# Patient Record
Sex: MALE | Race: WHITE | URBAN - METROPOLITAN AREA
[De-identification: names, ages, dates, MRNs, and addresses within clinical notes are randomized per-mention and may not be internally consistent; named-entity substitution may affect disease eponyms.]

---

## 2022-02-21 ENCOUNTER — APPOINTMENT (OUTPATIENT)
Dept: SURGERY | Age: 42
End: 2022-02-21

## 2022-02-24 ENCOUNTER — OFFICE VISIT (OUTPATIENT)
Dept: SURGERY | Facility: CLINIC | Age: 42
End: 2022-02-24
Payer: COMMERCIAL

## 2022-02-24 VITALS
DIASTOLIC BLOOD PRESSURE: 69 MMHG | HEART RATE: 130 BPM | BODY MASS INDEX: 26.6 KG/M2 | TEMPERATURE: 98 F | HEIGHT: 71 IN | WEIGHT: 190 LBS | SYSTOLIC BLOOD PRESSURE: 89 MMHG

## 2022-02-24 DIAGNOSIS — K40.20 BILATERAL INGUINAL HERNIA WITHOUT OBSTRUCTION OR GANGRENE, RECURRENCE NOT SPECIFIED: Primary | ICD-10-CM

## 2022-02-24 PROCEDURE — 3074F SYST BP LT 130 MM HG: CPT | Performed by: COLON & RECTAL SURGERY

## 2022-02-24 PROCEDURE — 3078F DIAST BP <80 MM HG: CPT | Performed by: COLON & RECTAL SURGERY

## 2022-02-24 PROCEDURE — 99204 OFFICE O/P NEW MOD 45 MIN: CPT | Performed by: COLON & RECTAL SURGERY

## 2022-02-24 PROCEDURE — 3008F BODY MASS INDEX DOCD: CPT | Performed by: COLON & RECTAL SURGERY

## 2022-02-24 NOTE — PATIENT INSTRUCTIONS
The patient presents today for evaluation of inguinal hernias. Patient states approximately a year ago he was lifting a bed frame when he noticed a bulge in his right groin. He states initially it was not very symptomatic, but over the past 2 to 3 months he has been experiencing more symptoms and thinks his hernia has grown in size. He states he has some pain in the area, but is still able to work and do activities of daily living. 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 He denies diarrhea, constipation or change in bowel movements. He denies fevers, chills, nausea, or vomiting. He does not have any significant past medical history. He is not currently taking any blood thinners. Clinical examination of the abdomen reveals it to be soft, nondistended, nontender, bowel sounds are normal activity normal pitch. There  is no rebounding tenderness or guarding. There are no signs of ascites or peritonitis. The liver and spleen are nonpalpable. There are no palpable masses. There are no umbilical or incisional hernias. He has a large right-sided inguinal hernia and a small left-sided inguinal hernia. both are large and with standing and Valsalva maneuver. Testes are descended, equal, and normal size bilaterally. Penis is normal and circumcised. The patient will be scheduled to undergo a bilateral inguinal hernia repair with mesh. The patient recently was off work for a right foot injury. He states because he is in a union for his job, he must work to months of every quarter. He wants to have the surgery in late June or late September, so he can guarantee that he will work at least 2 months of either the second or third quarter of this year.     I had a prolonged conversation with the patient regarding all risks, benefits, complications and alternatives to the proposed procedure(s) were fully discussed with the patient. All questions from the patient were answered in detail. A description of the procedure(s) possible outcomes was fully discussed. The patient seemed to understand the conversation and its details. Consent for the procedure(s) was confirmed with the patient.

## 2022-02-25 PROBLEM — K40.20 BILATERAL INGUINAL HERNIA WITHOUT OBSTRUCTION OR GANGRENE: Status: ACTIVE | Noted: 2022-02-25

## 2022-03-04 ENCOUNTER — TELEPHONE (OUTPATIENT)
Dept: SURGERY | Facility: CLINIC | Age: 42
End: 2022-03-04

## 2022-03-04 NOTE — TELEPHONE ENCOUNTER
Called patient after his employer called and asked for patient's work status. I explained we can not give that information out unless he is on his release of information form. The patient is coming in on Monday to sign a release stating his employer can have his work status from Dr. Bartolo France only. I told patient I would talk to Dr. Bartolo France on Monday regarding his work status and restrictions prior to his surgery in June. He was agreeable.

## 2022-03-07 ENCOUNTER — TELEPHONE (OUTPATIENT)
Dept: SURGERY | Facility: CLINIC | Age: 42
End: 2022-03-07

## 2022-03-07 NOTE — TELEPHONE ENCOUNTER
Talked to Dr. Mauro Valenzuela regarding work status letter patient needs for work. Dr. Mauro Valenzuela stated he can work until his surgery date with no restrictions. Patient must sign release before we release the letter.   Letter and release with [employer] with instructions for patient to sign release first.

## 2022-05-19 ENCOUNTER — TELEPHONE (OUTPATIENT)
Dept: SURGERY | Facility: CLINIC | Age: 42
End: 2022-05-19

## 2022-06-01 ENCOUNTER — LAB ENCOUNTER (OUTPATIENT)
Dept: LAB | Facility: HOSPITAL | Age: 42
End: 2022-06-01
Attending: COLON & RECTAL SURGERY
Payer: COMMERCIAL

## 2022-06-01 DIAGNOSIS — Z20.822 ENCOUNTER FOR PREOPERATIVE SCREENING LABORATORY TESTING FOR COVID-19 VIRUS: ICD-10-CM

## 2022-06-01 DIAGNOSIS — Z01.812 ENCOUNTER FOR PREOPERATIVE SCREENING LABORATORY TESTING FOR COVID-19 VIRUS: ICD-10-CM

## 2022-06-01 RX ORDER — SCOLOPAMINE TRANSDERMAL SYSTEM 1 MG/1
1 PATCH, EXTENDED RELEASE TRANSDERMAL ONCE
Status: CANCELLED | OUTPATIENT
Start: 2022-06-01 | End: 2022-06-01

## 2022-06-02 ENCOUNTER — ANESTHESIA EVENT (OUTPATIENT)
Dept: SURGERY | Facility: HOSPITAL | Age: 42
End: 2022-06-02
Payer: COMMERCIAL

## 2022-06-02 LAB — SARS-COV-2 RNA RESP QL NAA+PROBE: NOT DETECTED

## 2022-06-03 ENCOUNTER — HOSPITAL ENCOUNTER (OUTPATIENT)
Facility: HOSPITAL | Age: 42
Setting detail: HOSPITAL OUTPATIENT SURGERY
Discharge: HOME | End: 2022-06-03
Attending: COLON & RECTAL SURGERY | Admitting: COLON & RECTAL SURGERY
Payer: COMMERCIAL

## 2022-06-03 ENCOUNTER — ANESTHESIA (OUTPATIENT)
Dept: SURGERY | Facility: HOSPITAL | Age: 42
End: 2022-06-03
Payer: COMMERCIAL

## 2022-06-03 VITALS
HEART RATE: 66 BPM | TEMPERATURE: 99 F | RESPIRATION RATE: 18 BRPM | OXYGEN SATURATION: 100 % | BODY MASS INDEX: 26.99 KG/M2 | WEIGHT: 192.81 LBS | SYSTOLIC BLOOD PRESSURE: 146 MMHG | HEIGHT: 71 IN | DIASTOLIC BLOOD PRESSURE: 97 MMHG

## 2022-06-03 DIAGNOSIS — Z20.822 ENCOUNTER FOR PREOPERATIVE SCREENING LABORATORY TESTING FOR COVID-19 VIRUS: Primary | ICD-10-CM

## 2022-06-03 DIAGNOSIS — K40.20 BILATERAL INGUINAL HERNIA WITHOUT OBSTRUCTION OR GANGRENE, RECURRENCE NOT SPECIFIED: ICD-10-CM

## 2022-06-03 DIAGNOSIS — Z01.812 ENCOUNTER FOR PREOPERATIVE SCREENING LABORATORY TESTING FOR COVID-19 VIRUS: Primary | ICD-10-CM

## 2022-06-03 PROCEDURE — 0YUA4JZ SUPPLEMENT BILATERAL INGUINAL REGION WITH SYNTHETIC SUBSTITUTE, PERCUTANEOUS ENDOSCOPIC APPROACH: ICD-10-PCS | Performed by: COLON & RECTAL SURGERY

## 2022-06-03 DEVICE — BARD MESH
Type: IMPLANTABLE DEVICE | Site: INGUINAL | Status: FUNCTIONAL
Brand: BARD MESH

## 2022-06-03 RX ORDER — NEOSTIGMINE METHYLSULFATE 1 MG/ML
INJECTION INTRAVENOUS AS NEEDED
Status: DISCONTINUED | OUTPATIENT
Start: 2022-06-03 | End: 2022-06-03 | Stop reason: SURG

## 2022-06-03 RX ORDER — HYDROMORPHONE HYDROCHLORIDE 1 MG/ML
0.6 INJECTION, SOLUTION INTRAMUSCULAR; INTRAVENOUS; SUBCUTANEOUS EVERY 5 MIN PRN
Status: DISCONTINUED | OUTPATIENT
Start: 2022-06-03 | End: 2022-06-03

## 2022-06-03 RX ORDER — HEPARIN SODIUM 5000 [USP'U]/ML
5000 INJECTION, SOLUTION INTRAVENOUS; SUBCUTANEOUS ONCE
Status: COMPLETED | OUTPATIENT
Start: 2022-06-03 | End: 2022-06-03

## 2022-06-03 RX ORDER — KETOROLAC TROMETHAMINE 30 MG/ML
INJECTION, SOLUTION INTRAMUSCULAR; INTRAVENOUS AS NEEDED
Status: DISCONTINUED | OUTPATIENT
Start: 2022-06-03 | End: 2022-06-03 | Stop reason: SURG

## 2022-06-03 RX ORDER — NALOXONE HYDROCHLORIDE 0.4 MG/ML
80 INJECTION, SOLUTION INTRAMUSCULAR; INTRAVENOUS; SUBCUTANEOUS AS NEEDED
Status: DISCONTINUED | OUTPATIENT
Start: 2022-06-03 | End: 2022-06-03

## 2022-06-03 RX ORDER — ACETAMINOPHEN 500 MG
1000 TABLET ORAL ONCE
Status: DISCONTINUED | OUTPATIENT
Start: 2022-06-03 | End: 2022-06-03 | Stop reason: HOSPADM

## 2022-06-03 RX ORDER — SCOLOPAMINE TRANSDERMAL SYSTEM 1 MG/1
1 PATCH, EXTENDED RELEASE TRANSDERMAL ONCE
Status: DISCONTINUED | OUTPATIENT
Start: 2022-06-03 | End: 2022-06-03

## 2022-06-03 RX ORDER — HYDROMORPHONE HYDROCHLORIDE 1 MG/ML
0.4 INJECTION, SOLUTION INTRAMUSCULAR; INTRAVENOUS; SUBCUTANEOUS EVERY 5 MIN PRN
Status: DISCONTINUED | OUTPATIENT
Start: 2022-06-03 | End: 2022-06-03

## 2022-06-03 RX ORDER — DEXAMETHASONE SODIUM PHOSPHATE 4 MG/ML
VIAL (ML) INJECTION AS NEEDED
Status: DISCONTINUED | OUTPATIENT
Start: 2022-06-03 | End: 2022-06-03 | Stop reason: SURG

## 2022-06-03 RX ORDER — HYDROCODONE BITARTRATE AND ACETAMINOPHEN 5; 325 MG/1; MG/1
2 TABLET ORAL ONCE AS NEEDED
Status: COMPLETED | OUTPATIENT
Start: 2022-06-03 | End: 2022-06-03

## 2022-06-03 RX ORDER — PROCHLORPERAZINE EDISYLATE 5 MG/ML
5 INJECTION INTRAMUSCULAR; INTRAVENOUS EVERY 8 HOURS PRN
Status: DISCONTINUED | OUTPATIENT
Start: 2022-06-03 | End: 2022-06-03

## 2022-06-03 RX ORDER — MIDAZOLAM HYDROCHLORIDE 1 MG/ML
INJECTION INTRAMUSCULAR; INTRAVENOUS AS NEEDED
Status: DISCONTINUED | OUTPATIENT
Start: 2022-06-03 | End: 2022-06-03 | Stop reason: SURG

## 2022-06-03 RX ORDER — SODIUM CHLORIDE, SODIUM LACTATE, POTASSIUM CHLORIDE, CALCIUM CHLORIDE 600; 310; 30; 20 MG/100ML; MG/100ML; MG/100ML; MG/100ML
INJECTION, SOLUTION INTRAVENOUS CONTINUOUS
Status: DISCONTINUED | OUTPATIENT
Start: 2022-06-03 | End: 2022-06-03

## 2022-06-03 RX ORDER — ONDANSETRON 2 MG/ML
INJECTION INTRAMUSCULAR; INTRAVENOUS AS NEEDED
Status: DISCONTINUED | OUTPATIENT
Start: 2022-06-03 | End: 2022-06-03 | Stop reason: SURG

## 2022-06-03 RX ORDER — HYDROCODONE BITARTRATE AND ACETAMINOPHEN 5; 325 MG/1; MG/1
1 TABLET ORAL ONCE AS NEEDED
Status: COMPLETED | OUTPATIENT
Start: 2022-06-03 | End: 2022-06-03

## 2022-06-03 RX ORDER — SCOLOPAMINE TRANSDERMAL SYSTEM 1 MG/1
PATCH, EXTENDED RELEASE TRANSDERMAL
Status: DISCONTINUED
Start: 2022-06-03 | End: 2022-06-03

## 2022-06-03 RX ORDER — BUPIVACAINE HYDROCHLORIDE AND EPINEPHRINE 5; 5 MG/ML; UG/ML
INJECTION, SOLUTION EPIDURAL; INTRACAUDAL; PERINEURAL AS NEEDED
Status: DISCONTINUED | OUTPATIENT
Start: 2022-06-03 | End: 2022-06-03 | Stop reason: HOSPADM

## 2022-06-03 RX ORDER — CEFAZOLIN SODIUM/WATER 2 G/20 ML
2 SYRINGE (ML) INTRAVENOUS ONCE
Status: COMPLETED | OUTPATIENT
Start: 2022-06-03 | End: 2022-06-03

## 2022-06-03 RX ORDER — ROCURONIUM BROMIDE 10 MG/ML
INJECTION, SOLUTION INTRAVENOUS AS NEEDED
Status: DISCONTINUED | OUTPATIENT
Start: 2022-06-03 | End: 2022-06-03 | Stop reason: SURG

## 2022-06-03 RX ORDER — ONDANSETRON 2 MG/ML
4 INJECTION INTRAMUSCULAR; INTRAVENOUS EVERY 6 HOURS PRN
Status: DISCONTINUED | OUTPATIENT
Start: 2022-06-03 | End: 2022-06-03

## 2022-06-03 RX ORDER — ACETAMINOPHEN 500 MG
1000 TABLET ORAL ONCE AS NEEDED
Status: COMPLETED | OUTPATIENT
Start: 2022-06-03 | End: 2022-06-03

## 2022-06-03 RX ORDER — HYDROMORPHONE HYDROCHLORIDE 1 MG/ML
0.2 INJECTION, SOLUTION INTRAMUSCULAR; INTRAVENOUS; SUBCUTANEOUS EVERY 5 MIN PRN
Status: DISCONTINUED | OUTPATIENT
Start: 2022-06-03 | End: 2022-06-03

## 2022-06-03 RX ORDER — HYDROCODONE BITARTRATE AND ACETAMINOPHEN 5; 325 MG/1; MG/1
1 TABLET ORAL EVERY 6 HOURS PRN
Qty: 15 TABLET | Refills: 0 | Status: SHIPPED | OUTPATIENT
Start: 2022-06-03

## 2022-06-03 RX ORDER — LIDOCAINE HYDROCHLORIDE 10 MG/ML
INJECTION, SOLUTION EPIDURAL; INFILTRATION; INTRACAUDAL; PERINEURAL AS NEEDED
Status: DISCONTINUED | OUTPATIENT
Start: 2022-06-03 | End: 2022-06-03 | Stop reason: SURG

## 2022-06-03 RX ORDER — IBUPROFEN 600 MG/1
600 TABLET ORAL ONCE AS NEEDED
Status: DISCONTINUED | OUTPATIENT
Start: 2022-06-03 | End: 2022-06-03

## 2022-06-03 RX ORDER — LABETALOL HYDROCHLORIDE 5 MG/ML
5 INJECTION, SOLUTION INTRAVENOUS EVERY 5 MIN PRN
Status: DISCONTINUED | OUTPATIENT
Start: 2022-06-03 | End: 2022-06-03

## 2022-06-03 RX ORDER — GLYCOPYRROLATE 0.2 MG/ML
INJECTION, SOLUTION INTRAMUSCULAR; INTRAVENOUS AS NEEDED
Status: DISCONTINUED | OUTPATIENT
Start: 2022-06-03 | End: 2022-06-03 | Stop reason: SURG

## 2022-06-03 RX ADMIN — GLYCOPYRROLATE 0.8 MG: 0.2 INJECTION, SOLUTION INTRAMUSCULAR; INTRAVENOUS at 14:17:00

## 2022-06-03 RX ADMIN — MIDAZOLAM HYDROCHLORIDE 2 MG: 1 INJECTION INTRAMUSCULAR; INTRAVENOUS at 13:08:00

## 2022-06-03 RX ADMIN — KETOROLAC TROMETHAMINE 30 MG: 30 INJECTION, SOLUTION INTRAMUSCULAR; INTRAVENOUS at 14:13:00

## 2022-06-03 RX ADMIN — SODIUM CHLORIDE, SODIUM LACTATE, POTASSIUM CHLORIDE, CALCIUM CHLORIDE: 600; 310; 30; 20 INJECTION, SOLUTION INTRAVENOUS at 14:27:00

## 2022-06-03 RX ADMIN — DEXAMETHASONE SODIUM PHOSPHATE 8 MG: 4 MG/ML VIAL (ML) INJECTION at 13:13:00

## 2022-06-03 RX ADMIN — NEOSTIGMINE METHYLSULFATE 5 MG: 1 INJECTION INTRAVENOUS at 14:17:00

## 2022-06-03 RX ADMIN — CEFAZOLIN SODIUM/WATER 2 G: 2 G/20 ML SYRINGE (ML) INTRAVENOUS at 13:17:00

## 2022-06-03 RX ADMIN — LIDOCAINE HYDROCHLORIDE 50 MG: 10 INJECTION, SOLUTION EPIDURAL; INFILTRATION; INTRACAUDAL; PERINEURAL at 13:13:00

## 2022-06-03 RX ADMIN — ROCURONIUM BROMIDE 50 MG: 10 INJECTION, SOLUTION INTRAVENOUS at 13:13:00

## 2022-06-03 RX ADMIN — ONDANSETRON 4 MG: 2 INJECTION INTRAMUSCULAR; INTRAVENOUS at 13:58:00

## 2022-06-03 NOTE — ANESTHESIA POSTPROCEDURE EVALUATION
[facility] Patient Status:  Hospital Outpatient Surgery   Age/Gender 39year old male MRN [id number]   [facility] SURGERY Attending Ileana Campa MD   Hosp Day # 0 PCP No primary care provider on file. Anesthesia Post-op Note    BILATERAL LAPAROSCOPIC INGUINAL HERNIA REPAIR WITH MESH    Procedure Summary     Date: 06/03/22 Room / Location: 1404 [facility] OR 19 / 1404 [facility] OR    Anesthesia Start: 8644 Anesthesia Stop: 0303    Procedure: BILATERAL LAPAROSCOPIC INGUINAL HERNIA REPAIR WITH MESH (Bilateral Abdomen) Diagnosis:       Bilateral inguinal hernia without obstruction or gangrene, recurrence not specified      (Bilateral inguinal hernia without obstruction or gangrene, recurrence not specified [K40.20])    Surgeons: Ileana Campa MD Anesthesiologist: Steph Echavarria MD    Anesthesia Type: general ASA Status: Not recorded          Anesthesia Type: general    Vitals Value Taken Time   /97 06/03/22 1427   Temp 97.4 06/03/22 1427   Pulse 83 06/03/22 1427   Resp 14 06/03/22 1427   SpO2 98 06/03/22 1427       Patient Location: PACU    Anesthesia Type: general    Airway Patency: patent    Postop Pain Control: adequate    Mental Status: preanesthetic baseline    Nausea/Vomiting: none    Cardiopulmonary/Hydration status: stable euvolemic    Complications: no apparent anesthesia related complications    Postop vital signs: stable    Dental Exam: Unchanged from Preop    Patient to be discharged from PACU when criteria met.

## 2022-06-03 NOTE — OPERATIVE REPORT
[facility]  Operative Note    Ciro Munoz Location: OR   [facility] [id number] MRN [id number]   Admission Date 6/3/2022 Operation Date 6/3/2022   Attending Physician Errol Villar MD Operating Physician Jeannene Lesch, MD     Pre-Operative Diagnosis: Bilateral inguinal hernia without obstruction or gangrene, recurrence not specified [K40.20]    Post-Operative Diagnosis: Same as above    Procedure Performed:  Bilateral Laparoscopic Inguinal Hernia Repair with Mesh    Surgeon(s) and Role:     * Errol Villar MD - Primary  Assistant: Jacob Lomeli PA-C  The assistance of Jacob Lomeli was absolutely essential to the proper conduct of this case and further assisted with exact proper positioning of the mesh during tac application and complex dissection within the groin structures and anatomy. Anesthesia: General    History of Present Illness: This patient has bilateral inguinal hernias. He presents at this time for laparoscopic bilateral inguinal herniorrhaphy with mesh. Operative Findings: This patient was found to have a right very large direct inguinal hernia; and a left moderate sized direct inguinal hernia  Diagnostic laparoscopy revealed no significant other findings  Liver within normal limits, gallbladder is within normal limits  No significant adhesions  The patient was delivered to recovery room in stable condition    Description of Procedure: Following adequate general anesthesia, the patient was placed in the supine position on the operating room table. The abdomen was scrubbed with Chlorhexidine. Sterile drapes were placed with wide exposure of the abdomen from xiphoid to pubis. The umbilicus was inverted. A supraumbilical incision was made. A Veress needle was inserted into the abdominal cavity and allowed to insufflate with CO2. An 11-mm trocar was placed via this incision and used for diagnostic laparoscopy. The laparoscopic findings are noted above.       Two other trocars were placed in a symmetric fashion lateral to the rectus sheath near the level of the umbilicus. Each hernia was approached in an identical fashion. We began each procedure with a transverse incision through the peritoneum at the level of the pelvic floor, above the hernia defect, and above the pelvic floor structures. The peritoneum was then dissected back, inverting the hernia sac into the abdominal cavity. We then performed careful dissection identifying the pubic tubercle, Darien's ligament, the iliopubic tract, and the posterior rectus fascia. The Marlex mesh grafts were then fashioned to fit the hernia defect. They were secured medially to the pubic tubercle, posteriorly to Darien's ligament, laterally above the iliopubic tract, and anteriorly to the posterior rectus fascia. Once this was accomplished, the peritoneum was then closed over the mesh repair. All of this was accomplished using the hernia tacker device. We then began with closure of the abdomen. All laparoscopic instruments were removed from the patient and accounted for on a side table. The CO2 was suctioned from the abdominal cavity. The umbilical fascial defect was closed with 0 Maxon. The skin ports were all closed with 5-0 undyed Vicryl in a subcuticular fashion, and 0.5% Marcaine with epinephrine was injected into the wound margins. Sterile dressings were placed, and the patient was transported to recovery in stable postoperative condition.       Complications: None    EBL: 5 cc    Pathologic specimens:  none    Evelyn Graf MD  6/3/2022  2:31 PM

## 2022-06-03 NOTE — ANESTHESIA PROCEDURE NOTES
Airway  Date/Time: 6/3/2022 1:15 PM  Urgency: elective    Airway not difficult    General Information and Staff    Patient location during procedure: OR  Anesthesiologist: Modena Nissen, MD  Performed: anesthesiologist     Indications and Patient Condition  Indications for airway management: anesthesia  Spontaneous Ventilation: absent  Sedation level: deep  Preoxygenated: yes  Patient position: sniffing  MILS not maintained throughout  Mask difficulty assessment: 1 - vent by mask    Final Airway Details  Final airway type: endotracheal airway      Successful airway: ETT  Cuffed: yes   Successful intubation technique: direct laryngoscopy  Facilitating devices/methods: intubating stylet  Endotracheal tube insertion site: oral  Blade: Meir  Blade size: #3  ETT size (mm): 7.5    Cormack-Lehane Classification: grade IIA - partial view of glottis  Placement verified by: chest auscultation and capnometry   Cuff volume (mL): 5  Measured from: lips  ETT to lips (cm): 21  Number of attempts at approach: 1  Ventilation between attempts: none  Number of other approaches attempted: 0

## 2022-06-16 ENCOUNTER — OFFICE VISIT (OUTPATIENT)
Facility: LOCATION | Age: 42
End: 2022-06-16

## 2022-06-16 VITALS
WEIGHT: 192 LBS | TEMPERATURE: 98 F | SYSTOLIC BLOOD PRESSURE: 118 MMHG | HEART RATE: 74 BPM | DIASTOLIC BLOOD PRESSURE: 86 MMHG | BODY MASS INDEX: 26.88 KG/M2 | HEIGHT: 71 IN

## 2022-06-16 DIAGNOSIS — K40.20 BILATERAL INGUINAL HERNIA WITHOUT OBSTRUCTION OR GANGRENE, RECURRENCE NOT SPECIFIED: Primary | ICD-10-CM

## 2022-06-16 PROCEDURE — 3074F SYST BP LT 130 MM HG: CPT

## 2022-06-16 PROCEDURE — 99024 POSTOP FOLLOW-UP VISIT: CPT

## 2022-06-16 PROCEDURE — 3008F BODY MASS INDEX DOCD: CPT

## 2022-06-16 PROCEDURE — 3079F DIAST BP 80-89 MM HG: CPT

## 2022-06-20 ENCOUNTER — TELEPHONE (OUTPATIENT)
Facility: LOCATION | Age: 42
End: 2022-06-20

## 2022-10-04 ENCOUNTER — OFFICE VISIT (OUTPATIENT)
Facility: LOCATION | Age: 42
End: 2022-10-04
Payer: COMMERCIAL

## 2022-10-04 VITALS — TEMPERATURE: 97 F | HEART RATE: 71 BPM

## 2022-10-04 DIAGNOSIS — T14.8XXA MUSCLE STRAIN: Primary | ICD-10-CM

## 2022-10-04 DIAGNOSIS — K40.20 BILATERAL INGUINAL HERNIA WITHOUT OBSTRUCTION OR GANGRENE, RECURRENCE NOT SPECIFIED: ICD-10-CM

## 2022-10-04 PROCEDURE — 99213 OFFICE O/P EST LOW 20 MIN: CPT | Performed by: COLON & RECTAL SURGERY

## 2022-10-06 PROBLEM — T14.8XXA MUSCLE STRAIN: Status: ACTIVE | Noted: 2022-10-06

## 2022-10-06 NOTE — PATIENT INSTRUCTIONS
The patient presents for continued care and evaluation following a bilateral inguinal herniorrhaphy on 6/3/2022. The patient states he was initially doing well postoperatively, but last Friday he attempted to hold in a sneeze and felt a \"pop\" above the umbilicus to the left of midline. He is concerned about a possible hernia at this site. He denies feeling a bump in the area. He states the area remains mildly tender. He denies nausea or vomiting. He denies a change in bowel habits. Additionally, the patient states he has intermittent right groin pain. He states he has had this pain since the time of his operation. He states he only has the pain with certain movements. He describes the pain as a short, sharp pain that resolves quickly. 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 Clinical examination of the abdomen reveals it to be soft, nondistended, nontender, bowel sounds are normal activity normal pitch. There  is no rebounding tenderness or guarding. There are no signs of ascites or peritonitis. The liver and spleen are nonpalpable. There are no palpable masses. There are no umbilical or inguinal hernias. The patient has well-healed laparoscopic incision sites. His testes are equal in size and descended bilaterally. Penis is normal.    I explained to the patient that he has no evidence of hernias in his abdomen. I told him that he likely strained a muscle when he sneezed last week, and the pain will resolve in the next week. Additionally, I explained to the patient that his right groin pain is due to the placement of mesh. I informed him that awkward movements and heavy lifting may recreate that pain. I instructed him to continue to avoid lifting heavy objects at work. He should ask for assistance in tasks that require heavy lifting. The patient expressed understanding with the above plan.   All questions and concerns were addressed.

## 2022-12-20 ENCOUNTER — OFFICE VISIT (OUTPATIENT)
Facility: LOCATION | Age: 42
End: 2022-12-20
Payer: COMMERCIAL

## 2022-12-20 VITALS — HEART RATE: 72 BPM | TEMPERATURE: 98 F

## 2022-12-20 DIAGNOSIS — Z00.00 ROUTINE CHECK-UP: ICD-10-CM

## 2022-12-20 DIAGNOSIS — T14.8XXA MUSCLE STRAIN: Primary | ICD-10-CM

## 2022-12-20 PROCEDURE — 99214 OFFICE O/P EST MOD 30 MIN: CPT | Performed by: COLON & RECTAL SURGERY

## 2022-12-20 NOTE — PATIENT INSTRUCTIONS
This patient had a prior bilateral inguinal herniorrhaphy with mesh Clarita 3, 2022. The patient was doing some significant heavy lifting at work and went home from work on December 19, 2022. He states that he was really twisting and turning, straining, and struggling for balance based on the worksite. He states that when he got home from work, he had severe pain across the lower abdomen. It was left greater than right. He had swelling in the testes bilaterally. He had no nausea or vomiting. He has no fever or chills. He is feeling better. The pain across the lower abdomen is greatly improved. He still has some left-sided groin strain type symptoms. The patient has mesh across the entire lower abdomen based on his bilateral inguinal hernia repair. He is ambulatory. He does have trouble getting out of a chair, but once he has his balance, he has no clear impairment. The patient states he also feels \"tugging on the testicles bilaterally\". Clinical exam reveals the abdomen to be soft, nontender, nondistended, good bowel sounds. No guarding or rebound. No masses. No ascites. Liver is normal, spleen is not palpable. The patient has no evidence of recurrent or residual inguinal hernia in either of the groin regions. Testes are both present and descended and are currently not swollen. The left cord structures are slightly thicker than the right cord structures most likely based on the previous surgical intervention on the inguinal hernia in the left side. There is no evidence of hydrocele. I performed both a sitting and standing exam.  The patient had opportunity to produce strong Valsalva maneuvers, no evidence of hernia. I counseled the patient that he likely strained the regions of repair. He did not dislodge the mesh. No surgical intervention is necessary. He should take it easy for the next few days. He is getting better rapidly.   I gave him a return to work slip for December 27, 2022. He can return without restrictions. The patient states that he also strained his back at the same time as he strained his groin region. He is visiting his chiropractor. I have no further follow-up scheduled with this patient at this time. This patient can see me on an as-needed basis. This patient should return urgently for any problems or complications related to my surgical intervention.

## (undated) DIAGNOSIS — K40.90 INGUINAL HERNIA WITHOUT OBSTRUCTION OR GANGRENE, RECURRENCE NOT SPECIFIED, UNSPECIFIED LATERALITY: Primary | ICD-10-CM

## (undated) DEVICE — CAPSURE PERMANENT FIXATION SYSTEM 30 PERMANENT FASTENERS: Brand: CAPSURE PERMANENT FIXATION SYSTEM

## (undated) DEVICE — SOLUTION  .9 1000ML BTL

## (undated) DEVICE — SLEEVE KENDALL SCD EXPRESS MED

## (undated) DEVICE — VIOLET BRAIDED (POLYGLACTIN 910), SYNTHETIC ABSORBABLE SUTURE: Brand: COATED VICRYL

## (undated) DEVICE — 40580 - THE PINK PAD - ADVANCED TRENDELENBURG POSITIONING KIT: Brand: 40580 - THE PINK PAD - ADVANCED TRENDELENBURG POSITIONING KIT

## (undated) DEVICE — MONOFILAMENT ABSORBABLE SUTURE: Brand: MAXON

## (undated) DEVICE — STERILE POLYISOPRENE POWDER-FREE SURGICAL GLOVES: Brand: PROTEXIS

## (undated) DEVICE — SPONGE STICK WITH PVP-I: Brand: KENDALL

## (undated) DEVICE — ENDOPATH ULTRA VERESS INSUFFLATION NEEDLES WITH LUER LOCK CONNECTORS: Brand: ENDOPATH

## (undated) DEVICE — LIGHT HANDLE

## (undated) DEVICE — TROCAR: Brand: KII SHIELDED BLADED ACCESS SYSTEM

## (undated) DEVICE — APPLICATOR CHLORAPREP 26ML

## (undated) DEVICE — 2, DISPOSABLE SUCTION/IRRIGATOR WITHOUT DISPOSABLE TIP: Brand: STRYKEFLOW

## (undated) DEVICE — TROCAR: Brand: KII SLEEVE

## (undated) DEVICE — PAD SACRAL PREMIUM 12X12X1

## (undated) DEVICE — GENERAL LAPAROS CDS-LF: Brand: MEDLINE INDUSTRIES, INC.

## (undated) DEVICE — TRAY SURESTEP 16 BARDEX UMETR

## (undated) DEVICE — SUT VICRYL 5-0 PC-1 J834G

## (undated) NOTE — LETTER
2022    Return to Work    Name: Willian Mejia        : 1980    To Whom It May Concern,    Willian Mejia was seen in the office on 2022 and is to return to full duty as of 2022 without restrictions. If there are any further questions, regarding this patient's care, please contact the patient directly.     Sincerely,    Dr. Angela Cosby

## (undated) NOTE — LETTER
2022    Return to Work    Name: Reilly Samuel        : 1980    To Whom It May Concern,    Doug Pandya missed work yesterday and was seen in the office on 2022 and is to return to full duty as of 2022 without restrictions. If there are any further questions, regarding this patient's care, please contact the patient directly.     Sincerely,    Dr. Lary Hudson